# Patient Record
Sex: MALE | Race: WHITE | Employment: FULL TIME | URBAN - NONMETROPOLITAN AREA
[De-identification: names, ages, dates, MRNs, and addresses within clinical notes are randomized per-mention and may not be internally consistent; named-entity substitution may affect disease eponyms.]

---

## 2018-03-10 ENCOUNTER — OFFICE VISIT (OUTPATIENT)
Dept: PRIMARY CARE CLINIC | Age: 33
End: 2018-03-10
Payer: COMMERCIAL

## 2018-03-10 VITALS
TEMPERATURE: 99.1 F | WEIGHT: 222 LBS | OXYGEN SATURATION: 96 % | SYSTOLIC BLOOD PRESSURE: 110 MMHG | DIASTOLIC BLOOD PRESSURE: 86 MMHG | BODY MASS INDEX: 31.08 KG/M2 | HEIGHT: 71 IN | HEART RATE: 88 BPM

## 2018-03-10 DIAGNOSIS — J03.90 TONSILLITIS: Primary | ICD-10-CM

## 2018-03-10 LAB — S PYO AG THROAT QL: NORMAL

## 2018-03-10 PROCEDURE — 99202 OFFICE O/P NEW SF 15 MIN: CPT | Performed by: FAMILY MEDICINE

## 2018-03-10 PROCEDURE — 87880 STREP A ASSAY W/OPTIC: CPT | Performed by: FAMILY MEDICINE

## 2018-03-10 ASSESSMENT — ENCOUNTER SYMPTOMS
SHORTNESS OF BREATH: 0
TROUBLE SWALLOWING: 0
NAUSEA: 0
DIARRHEA: 0
CONSTIPATION: 0
EYE REDNESS: 0
EYE DISCHARGE: 0
COUGH: 1
SINUS PAIN: 0
VOMITING: 0
RHINORRHEA: 1
SINUS PRESSURE: 0
ABDOMINAL PAIN: 0
SORE THROAT: 1
WHEEZING: 0

## 2018-04-13 ENCOUNTER — APPOINTMENT (OUTPATIENT)
Dept: GENERAL RADIOLOGY | Age: 33
End: 2018-04-13
Payer: COMMERCIAL

## 2018-04-13 ENCOUNTER — HOSPITAL ENCOUNTER (EMERGENCY)
Age: 33
Discharge: HOME | End: 2018-04-13
Attending: EMERGENCY MEDICINE
Payer: COMMERCIAL

## 2018-04-13 VITALS
SYSTOLIC BLOOD PRESSURE: 133 MMHG | WEIGHT: 220 LBS | BODY MASS INDEX: 30.8 KG/M2 | TEMPERATURE: 97.4 F | HEIGHT: 71 IN | RESPIRATION RATE: 15 BRPM | DIASTOLIC BLOOD PRESSURE: 83 MMHG | OXYGEN SATURATION: 99 % | HEART RATE: 102 BPM

## 2018-04-13 DIAGNOSIS — S90.31XA CONTUSION OF RIGHT FOOT, INITIAL ENCOUNTER: Primary | ICD-10-CM

## 2018-04-13 PROCEDURE — 73630 X-RAY EXAM OF FOOT: CPT

## 2018-04-13 PROCEDURE — 73610 X-RAY EXAM OF ANKLE: CPT

## 2018-04-13 PROCEDURE — 99283 EMERGENCY DEPT VISIT LOW MDM: CPT

## 2018-04-13 ASSESSMENT — PAIN DESCRIPTION - LOCATION: LOCATION: ANKLE;FOOT

## 2018-04-13 ASSESSMENT — PAIN SCALES - GENERAL: PAINLEVEL_OUTOF10: 8

## 2018-04-13 ASSESSMENT — PAIN DESCRIPTION - ORIENTATION: ORIENTATION: RIGHT

## 2018-04-26 ENCOUNTER — HOSPITAL ENCOUNTER (OUTPATIENT)
Dept: MRI IMAGING | Age: 33
Discharge: HOME | End: 2018-04-26
Payer: COMMERCIAL

## 2018-04-26 DIAGNOSIS — S86.001A INJURY OF RIGHT ACHILLES TENDON, INITIAL ENCOUNTER: ICD-10-CM

## 2018-04-26 PROCEDURE — 73721 MRI JNT OF LWR EXTRE W/O DYE: CPT

## 2022-04-07 ENCOUNTER — TELEPHONE (OUTPATIENT)
Dept: FAMILY MEDICINE CLINIC | Age: 37
End: 2022-04-07

## 2022-06-08 ENCOUNTER — OFFICE VISIT (OUTPATIENT)
Dept: FAMILY MEDICINE CLINIC | Age: 37
End: 2022-06-08
Payer: COMMERCIAL

## 2022-06-08 VITALS
SYSTOLIC BLOOD PRESSURE: 128 MMHG | OXYGEN SATURATION: 96 % | HEIGHT: 71 IN | WEIGHT: 249 LBS | HEART RATE: 88 BPM | BODY MASS INDEX: 34.86 KG/M2 | RESPIRATION RATE: 18 BRPM | DIASTOLIC BLOOD PRESSURE: 76 MMHG | TEMPERATURE: 97.7 F

## 2022-06-08 DIAGNOSIS — G47.33 OSA ON CPAP: Primary | ICD-10-CM

## 2022-06-08 DIAGNOSIS — Z99.89 OSA ON CPAP: Primary | ICD-10-CM

## 2022-06-08 DIAGNOSIS — M25.562 ACUTE PAIN OF LEFT KNEE: ICD-10-CM

## 2022-06-08 PROCEDURE — G8427 DOCREV CUR MEDS BY ELIG CLIN: HCPCS | Performed by: NURSE PRACTITIONER

## 2022-06-08 PROCEDURE — G8417 CALC BMI ABV UP PARAM F/U: HCPCS | Performed by: NURSE PRACTITIONER

## 2022-06-08 PROCEDURE — 4004F PT TOBACCO SCREEN RCVD TLK: CPT | Performed by: NURSE PRACTITIONER

## 2022-06-08 PROCEDURE — 99203 OFFICE O/P NEW LOW 30 MIN: CPT | Performed by: NURSE PRACTITIONER

## 2022-06-08 RX ORDER — CETIRIZINE HYDROCHLORIDE 10 MG/1
10 TABLET ORAL DAILY
COMMUNITY

## 2022-06-08 ASSESSMENT — PATIENT HEALTH QUESTIONNAIRE - PHQ9
SUM OF ALL RESPONSES TO PHQ QUESTIONS 1-9: 0
SUM OF ALL RESPONSES TO PHQ QUESTIONS 1-9: 0
SUM OF ALL RESPONSES TO PHQ9 QUESTIONS 1 & 2: 0
2. FEELING DOWN, DEPRESSED OR HOPELESS: 0
SUM OF ALL RESPONSES TO PHQ QUESTIONS 1-9: 0
SUM OF ALL RESPONSES TO PHQ QUESTIONS 1-9: 0
1. LITTLE INTEREST OR PLEASURE IN DOING THINGS: 0

## 2022-06-08 NOTE — PROGRESS NOTES
Neetu Dennis (:  1985) is a 39 y.o. male,Established patient, here for evaluation of the following chief complaint(s): Other ( left sided knee pain )         ASSESSMENT/PLAN:  1. SHERRY on CPAP  2. Acute pain of left knee  -     XR KNEE LEFT (MIN 4 VIEWS); Future    Obtain xray  otc pain relievers  Stretch  Script given for cpap supplies    Return if symptoms worsen or fail to improve. Subjective   SUBJECTIVE/OBJECTIVE:  HPI    Left knee pain when he stepped onto the bed of truck. Hurt for 2 days then stopped hurting. Yesterday kneels down on it and it hurt really bad. Pain minimal today. Hx of SHERRY. On cpap. Was doing good. States needs new supplies for it. Review of Systems   Constitutional: Negative for activity change, appetite change, chills, diaphoresis, fatigue, fever and unexpected weight change. HENT: Negative for congestion, ear pain, postnasal drip, sinus pressure and sore throat. Eyes: Negative for visual disturbance. Respiratory: Negative for cough, chest tightness, shortness of breath, wheezing and stridor. Cardiovascular: Negative for chest pain, palpitations and leg swelling. Gastrointestinal: Negative for abdominal distention, abdominal pain, constipation, diarrhea, nausea and vomiting. Endocrine: Negative for polydipsia, polyphagia and polyuria. Genitourinary: Negative for decreased urine volume, difficulty urinating, dysuria, flank pain, frequency, hematuria and urgency. Musculoskeletal: Positive for arthralgias. Negative for back pain, gait problem, joint swelling, myalgias and neck pain. Skin: Negative for color change, pallor and rash. Neurological: Negative for dizziness, syncope, weakness, light-headedness, numbness and headaches. Hematological: Negative for adenopathy. Psychiatric/Behavioral: Negative for behavioral problems, self-injury and sleep disturbance. The patient is not nervous/anxious.            Objective   Physical Exam  Vitals reviewed. Constitutional:       General: He is not in acute distress. Appearance: Normal appearance. He is well-developed. HENT:      Head: Normocephalic. Right Ear: External ear normal.      Left Ear: External ear normal.      Nose: Nose normal.      Right Sinus: No maxillary sinus tenderness. Left Sinus: No maxillary sinus tenderness. Mouth/Throat:      Pharynx: Uvula midline. Neck:      Trachea: Trachea normal.   Cardiovascular:      Rate and Rhythm: Normal rate and regular rhythm. Heart sounds: Normal heart sounds. No murmur heard. Pulmonary:      Effort: Pulmonary effort is normal. No respiratory distress. Breath sounds: Normal breath sounds. No decreased breath sounds, wheezing, rhonchi or rales. Chest:      Chest wall: No tenderness. Abdominal:      General: There is no distension. Palpations: Abdomen is soft. There is no mass. Tenderness: There is no abdominal tenderness. Musculoskeletal:         General: Tenderness present. No deformity. Normal range of motion. Cervical back: Normal range of motion and neck supple. Lymphadenopathy:      Cervical: No cervical adenopathy. Skin:     General: Skin is warm and dry. Neurological:      Mental Status: He is alert and oriented to person, place, and time. Motor: No abnormal muscle tone. Coordination: Coordination normal.      Gait: Gait normal.   Psychiatric:         Mood and Affect: Mood normal.         Behavior: Behavior normal.         Thought Content: Thought content normal.         Judgment: Judgment normal.            On this date 6/8/2022 I have spent 34 minutes reviewing previous notes, test results and face to face with the patient discussing the diagnosis and importance of compliance with the treatment plan as well as documenting on the day of the visit. An electronic signature was used to authenticate this note.     --ELIESER Mcintosh - CNP

## 2022-06-09 ENCOUNTER — HOSPITAL ENCOUNTER (OUTPATIENT)
Age: 37
Discharge: HOME | End: 2022-06-09
Payer: COMMERCIAL

## 2022-06-09 ENCOUNTER — HOSPITAL ENCOUNTER (OUTPATIENT)
Dept: GENERAL RADIOLOGY | Age: 37
Discharge: HOME | End: 2022-06-09
Payer: COMMERCIAL

## 2022-06-09 DIAGNOSIS — M25.562 ACUTE PAIN OF LEFT KNEE: ICD-10-CM

## 2022-06-09 PROCEDURE — 73564 X-RAY EXAM KNEE 4 OR MORE: CPT

## 2022-06-13 ASSESSMENT — ENCOUNTER SYMPTOMS
SHORTNESS OF BREATH: 0
STRIDOR: 0
COLOR CHANGE: 0
SINUS PRESSURE: 0
DIARRHEA: 0
WHEEZING: 0
COUGH: 0
BACK PAIN: 0
NAUSEA: 0
CONSTIPATION: 0
SORE THROAT: 0
CHEST TIGHTNESS: 0
ABDOMINAL DISTENTION: 0
ABDOMINAL PAIN: 0
VOMITING: 0

## 2023-07-20 ENCOUNTER — OFFICE VISIT (OUTPATIENT)
Dept: FAMILY MEDICINE CLINIC | Age: 38
End: 2023-07-20
Payer: COMMERCIAL

## 2023-07-20 VITALS
HEART RATE: 78 BPM | OXYGEN SATURATION: 98 % | HEIGHT: 71 IN | TEMPERATURE: 98.2 F | BODY MASS INDEX: 34.75 KG/M2 | WEIGHT: 248.25 LBS | DIASTOLIC BLOOD PRESSURE: 86 MMHG | RESPIRATION RATE: 16 BRPM | SYSTOLIC BLOOD PRESSURE: 124 MMHG

## 2023-07-20 DIAGNOSIS — J01.00 ACUTE NON-RECURRENT MAXILLARY SINUSITIS: Primary | ICD-10-CM

## 2023-07-20 DIAGNOSIS — F41.1 GAD (GENERALIZED ANXIETY DISORDER): ICD-10-CM

## 2023-07-20 PROCEDURE — 4004F PT TOBACCO SCREEN RCVD TLK: CPT

## 2023-07-20 PROCEDURE — 99213 OFFICE O/P EST LOW 20 MIN: CPT

## 2023-07-20 PROCEDURE — G8427 DOCREV CUR MEDS BY ELIG CLIN: HCPCS

## 2023-07-20 PROCEDURE — G8417 CALC BMI ABV UP PARAM F/U: HCPCS

## 2023-07-20 RX ORDER — AZITHROMYCIN 250 MG/1
250 TABLET, FILM COATED ORAL SEE ADMIN INSTRUCTIONS
Qty: 6 TABLET | Refills: 0 | Status: SHIPPED | OUTPATIENT
Start: 2023-07-20 | End: 2023-07-25

## 2023-07-20 SDOH — ECONOMIC STABILITY: INCOME INSECURITY: HOW HARD IS IT FOR YOU TO PAY FOR THE VERY BASICS LIKE FOOD, HOUSING, MEDICAL CARE, AND HEATING?: NOT HARD AT ALL

## 2023-07-20 SDOH — ECONOMIC STABILITY: FOOD INSECURITY: WITHIN THE PAST 12 MONTHS, THE FOOD YOU BOUGHT JUST DIDN'T LAST AND YOU DIDN'T HAVE MONEY TO GET MORE.: NEVER TRUE

## 2023-07-20 SDOH — ECONOMIC STABILITY: FOOD INSECURITY: WITHIN THE PAST 12 MONTHS, YOU WORRIED THAT YOUR FOOD WOULD RUN OUT BEFORE YOU GOT MONEY TO BUY MORE.: NEVER TRUE

## 2023-07-20 SDOH — ECONOMIC STABILITY: HOUSING INSECURITY
IN THE LAST 12 MONTHS, WAS THERE A TIME WHEN YOU DID NOT HAVE A STEADY PLACE TO SLEEP OR SLEPT IN A SHELTER (INCLUDING NOW)?: NO

## 2023-07-20 ASSESSMENT — ENCOUNTER SYMPTOMS
COUGH: 0
SINUS PAIN: 1
DIARRHEA: 0
WHEEZING: 0
EYE PAIN: 0
EYE ITCHING: 0
NAUSEA: 0
SHORTNESS OF BREATH: 0
SINUS PRESSURE: 1
CHEST TIGHTNESS: 0
ABDOMINAL PAIN: 0
RHINORRHEA: 1
EYE REDNESS: 0
EYE DISCHARGE: 0
CONSTIPATION: 0
SORE THROAT: 0

## 2023-07-20 ASSESSMENT — PATIENT HEALTH QUESTIONNAIRE - PHQ9
1. LITTLE INTEREST OR PLEASURE IN DOING THINGS: 0
SUM OF ALL RESPONSES TO PHQ QUESTIONS 1-9: 0
2. FEELING DOWN, DEPRESSED OR HOPELESS: 0
SUM OF ALL RESPONSES TO PHQ QUESTIONS 1-9: 0
SUM OF ALL RESPONSES TO PHQ9 QUESTIONS 1 & 2: 0

## 2023-07-20 NOTE — PROGRESS NOTES
Vaping Use: Never used   Substance and Sexual Activity    Alcohol use: Yes     Comment: 2 beers    Drug use: No    Sexual activity: Yes     Partners: Female   Other Topics Concern    Not on file   Social History Narrative    Not on file     Social Determinants of Health     Financial Resource Strain: Low Risk     Difficulty of Paying Living Expenses: Not hard at all   Food Insecurity: No Food Insecurity    Worried About [place] in the Last Year: Never true    [address] in the Last Year: Never true   Transportation Needs: Unknown    Lack of Transportation (Medical): Not on file    Lack of Transportation (Non-Medical): No   Physical Activity: Not on file   Stress: Not on file   Social Connections: Not on file   Intimate Partner Violence: Not on file   Housing Stability: Unknown    Unable to Pay for Housing in the Last Year: Not on file    Number of Places Lived in the Last Year: Not on file    Unstable Housing in the Last Year: No        Family History   Problem Relation Age of Onset    Heart Disease Father         Allergies   Allergen Reactions    Aspirin Shortness Of Breath    Ceclor [Cefaclor] Swelling    Ibuprofen         Review of Systems   Constitutional:  Negative for activity change, appetite change, chills, fatigue and fever. HENT:  Positive for congestion, postnasal drip, rhinorrhea, sinus pressure and sinus pain. Negative for ear pain and sore throat. Eyes:  Negative for pain, discharge, redness and itching. Respiratory:  Negative for cough, chest tightness, shortness of breath and wheezing. Cardiovascular:  Negative for chest pain and palpitations. Gastrointestinal:  Negative for abdominal pain, constipation, diarrhea and nausea. Genitourinary:  Negative for dysuria and hematuria. Musculoskeletal:  Negative for arthralgias and myalgias. Skin:  Negative for rash. Neurological:  Positive for headaches. Negative for dizziness.    Psychiatric/Behavioral:  Negative for

## 2023-07-27 ENCOUNTER — TELEPHONE (OUTPATIENT)
Dept: FAMILY MEDICINE CLINIC | Age: 38
End: 2023-07-27

## 2023-07-27 DIAGNOSIS — J01.00 ACUTE NON-RECURRENT MAXILLARY SINUSITIS: Primary | ICD-10-CM

## 2023-07-27 RX ORDER — PREDNISONE 20 MG/1
20 TABLET ORAL 2 TIMES DAILY
Qty: 10 TABLET | Refills: 0 | Status: SHIPPED | OUTPATIENT
Start: 2023-07-27 | End: 2023-08-01

## 2023-07-27 NOTE — TELEPHONE ENCOUNTER
Pt states he has finished the z-shruthi that was precribed at his visit on 7/20 with you. He states he still having a lot of nasal congestion with sinus pressure. He wondering what else you recommend.

## 2024-01-04 ENCOUNTER — OFFICE VISIT (OUTPATIENT)
Dept: FAMILY MEDICINE CLINIC | Age: 39
End: 2024-01-04
Payer: COMMERCIAL

## 2024-01-04 VITALS
HEART RATE: 76 BPM | TEMPERATURE: 98 F | SYSTOLIC BLOOD PRESSURE: 128 MMHG | OXYGEN SATURATION: 97 % | RESPIRATION RATE: 16 BRPM | BODY MASS INDEX: 36.51 KG/M2 | WEIGHT: 260.8 LBS | HEIGHT: 71 IN | DIASTOLIC BLOOD PRESSURE: 84 MMHG

## 2024-01-04 DIAGNOSIS — J01.00 ACUTE NON-RECURRENT MAXILLARY SINUSITIS: ICD-10-CM

## 2024-01-04 DIAGNOSIS — R09.81 NASAL CONGESTION: Primary | ICD-10-CM

## 2024-01-04 PROCEDURE — 99213 OFFICE O/P EST LOW 20 MIN: CPT

## 2024-01-04 PROCEDURE — G8427 DOCREV CUR MEDS BY ELIG CLIN: HCPCS

## 2024-01-04 PROCEDURE — 4004F PT TOBACCO SCREEN RCVD TLK: CPT

## 2024-01-04 PROCEDURE — G8484 FLU IMMUNIZE NO ADMIN: HCPCS

## 2024-01-04 PROCEDURE — G8417 CALC BMI ABV UP PARAM F/U: HCPCS

## 2024-01-04 RX ORDER — AMOXICILLIN AND CLAVULANATE POTASSIUM 875; 125 MG/1; MG/1
1 TABLET, FILM COATED ORAL 2 TIMES DAILY
Qty: 20 TABLET | Refills: 0 | Status: SHIPPED | OUTPATIENT
Start: 2024-01-04 | End: 2024-01-14

## 2024-01-04 RX ORDER — METHYLPREDNISOLONE 4 MG/1
TABLET ORAL
Qty: 1 KIT | Refills: 0 | Status: SHIPPED | OUTPATIENT
Start: 2024-01-04 | End: 2024-01-10

## 2024-01-04 ASSESSMENT — PATIENT HEALTH QUESTIONNAIRE - PHQ9
1. LITTLE INTEREST OR PLEASURE IN DOING THINGS: 0
SUM OF ALL RESPONSES TO PHQ9 QUESTIONS 1 & 2: 0
SUM OF ALL RESPONSES TO PHQ QUESTIONS 1-9: 0
2. FEELING DOWN, DEPRESSED OR HOPELESS: 0
SUM OF ALL RESPONSES TO PHQ QUESTIONS 1-9: 0

## 2024-01-04 ASSESSMENT — ENCOUNTER SYMPTOMS
COUGH: 1
SORE THROAT: 0
NAUSEA: 0
WHEEZING: 0
RHINORRHEA: 0
DIARRHEA: 0
SINUS PAIN: 1
VOMITING: 0
EYE ITCHING: 0
EYE PAIN: 0
CONSTIPATION: 0
ABDOMINAL PAIN: 1
SHORTNESS OF BREATH: 0
CHEST TIGHTNESS: 0
EYE DISCHARGE: 0
SINUS PRESSURE: 1
EYE REDNESS: 0

## 2024-01-04 NOTE — PROGRESS NOTES
coughing he did start to develop left sided abdominal pain. He notices small lump that is semi tender in this area. Was not present prior. IF he holds this spot while coughing the pain does improve. He has not constipation, indigestion, nausea, vomiting, or diarrhea. No blood in stool.     Past Medical History:   Diagnosis Date    Asthma     Multiple allergies         Past Surgical History:   Procedure Laterality Date    SINUS SURGERY         Social History     Socioeconomic History    Marital status:      Spouse name: Not on file    Number of children: Not on file    Years of education: Not on file    Highest education level: Not on file   Occupational History    Not on file   Tobacco Use    Smoking status: Never    Smokeless tobacco: Current     Types: Chew   Vaping Use    Vaping Use: Never used   Substance and Sexual Activity    Alcohol use: Yes     Comment: 2 beers    Drug use: No    Sexual activity: Yes     Partners: Female   Other Topics Concern    Not on file   Social History Narrative    Not on file     Social Determinants of Health     Financial Resource Strain: Low Risk  (7/20/2023)    Overall Financial Resource Strain (CARDIA)     Difficulty of Paying Living Expenses: Not hard at all   Food Insecurity: Not on file (7/20/2023)   Transportation Needs: Unknown (7/20/2023)    PRAPARE - Transportation     Lack of Transportation (Medical): Not on file     Lack of Transportation (Non-Medical): No   Physical Activity: Not on file   Stress: Not on file   Social Connections: Not on file   Intimate Partner Violence: Not on file   Housing Stability: Unknown (7/20/2023)    Housing Stability Vital Sign     Unable to Pay for Housing in the Last Year: Not on file     Number of Places Lived in the Last Year: Not on file     Unstable Housing in the Last Year: No        Family History   Problem Relation Age of Onset    Heart Disease Father         Allergies   Allergen Reactions    Aspirin Shortness Of Breath

## 2024-04-15 ENCOUNTER — OFFICE VISIT (OUTPATIENT)
Dept: FAMILY MEDICINE CLINIC | Age: 39
End: 2024-04-15
Payer: COMMERCIAL

## 2024-04-15 VITALS
OXYGEN SATURATION: 96 % | SYSTOLIC BLOOD PRESSURE: 124 MMHG | HEIGHT: 71 IN | RESPIRATION RATE: 18 BRPM | DIASTOLIC BLOOD PRESSURE: 86 MMHG | WEIGHT: 254 LBS | HEART RATE: 81 BPM | BODY MASS INDEX: 35.56 KG/M2

## 2024-04-15 DIAGNOSIS — J01.00 ACUTE NON-RECURRENT MAXILLARY SINUSITIS: Primary | ICD-10-CM

## 2024-04-15 PROCEDURE — 4004F PT TOBACCO SCREEN RCVD TLK: CPT | Performed by: NURSE PRACTITIONER

## 2024-04-15 PROCEDURE — G8427 DOCREV CUR MEDS BY ELIG CLIN: HCPCS | Performed by: NURSE PRACTITIONER

## 2024-04-15 PROCEDURE — G8417 CALC BMI ABV UP PARAM F/U: HCPCS | Performed by: NURSE PRACTITIONER

## 2024-04-15 PROCEDURE — 99213 OFFICE O/P EST LOW 20 MIN: CPT | Performed by: NURSE PRACTITIONER

## 2024-04-15 RX ORDER — AMOXICILLIN AND CLAVULANATE POTASSIUM 875; 125 MG/1; MG/1
1 TABLET, FILM COATED ORAL 2 TIMES DAILY
Qty: 20 TABLET | Refills: 0 | Status: SHIPPED | OUTPATIENT
Start: 2024-04-15 | End: 2024-04-25

## 2024-04-15 RX ORDER — PREDNISONE 20 MG/1
20 TABLET ORAL 2 TIMES DAILY
Qty: 10 TABLET | Refills: 0 | Status: SHIPPED | OUTPATIENT
Start: 2024-04-15 | End: 2024-04-20

## 2024-04-15 ASSESSMENT — ENCOUNTER SYMPTOMS
DIARRHEA: 0
SHORTNESS OF BREATH: 0
COLOR CHANGE: 0
CHEST TIGHTNESS: 0
ABDOMINAL DISTENTION: 0
ABDOMINAL PAIN: 0
STRIDOR: 0
NAUSEA: 0
WHEEZING: 0
CONSTIPATION: 0
COUGH: 0
SINUS PRESSURE: 1
VOMITING: 0
BACK PAIN: 0
SORE THROAT: 0

## 2024-04-15 NOTE — PROGRESS NOTES
Head: Normocephalic and atraumatic.      Right Ear: External ear normal.      Left Ear: External ear normal.      Nose: Congestion present.      Right Sinus: No maxillary sinus tenderness.      Left Sinus: No maxillary sinus tenderness.      Mouth/Throat:      Mouth: Mucous membranes are moist.      Pharynx: Oropharynx is clear. Uvula midline. No oropharyngeal exudate or posterior oropharyngeal erythema.   Neck:      Trachea: Trachea normal.   Cardiovascular:      Rate and Rhythm: Normal rate and regular rhythm.      Heart sounds: Normal heart sounds. No murmur heard.  Pulmonary:      Effort: Pulmonary effort is normal. No respiratory distress.      Breath sounds: Normal breath sounds. No decreased breath sounds, wheezing, rhonchi or rales.   Chest:      Chest wall: No tenderness.   Abdominal:      General: There is no distension.      Palpations: Abdomen is soft. There is no mass.      Tenderness: There is no abdominal tenderness.   Musculoskeletal:         General: No tenderness or deformity. Normal range of motion.      Cervical back: Normal range of motion and neck supple.   Lymphadenopathy:      Cervical: No cervical adenopathy.   Skin:     General: Skin is warm and dry.   Neurological:      Mental Status: He is alert and oriented to person, place, and time.      Motor: No abnormal muscle tone.      Coordination: Coordination normal.      Gait: Gait normal.   Psychiatric:         Mood and Affect: Mood normal.         Behavior: Behavior normal.         Thought Content: Thought content normal.         Judgment: Judgment normal.            On this date 4/15/2024 I have spent 25 minutes reviewing previous notes, test results and face to face with the patient discussing the diagnosis and importance of compliance with the treatment plan as well as documenting on the day of the visit.      An electronic signature was used to authenticate this note.    --Bubba Grider, ELIESER - CNP

## 2024-05-03 ENCOUNTER — OFFICE VISIT (OUTPATIENT)
Dept: FAMILY MEDICINE CLINIC | Age: 39
End: 2024-05-03
Payer: COMMERCIAL

## 2024-05-03 VITALS
SYSTOLIC BLOOD PRESSURE: 118 MMHG | HEART RATE: 72 BPM | RESPIRATION RATE: 18 BRPM | BODY MASS INDEX: 36.26 KG/M2 | OXYGEN SATURATION: 96 % | TEMPERATURE: 98.3 F | DIASTOLIC BLOOD PRESSURE: 74 MMHG | HEIGHT: 71 IN | WEIGHT: 259 LBS

## 2024-05-03 DIAGNOSIS — J30.2 SEASONAL ALLERGIES: Primary | ICD-10-CM

## 2024-05-03 PROCEDURE — 99213 OFFICE O/P EST LOW 20 MIN: CPT

## 2024-05-03 PROCEDURE — G8427 DOCREV CUR MEDS BY ELIG CLIN: HCPCS

## 2024-05-03 PROCEDURE — G8417 CALC BMI ABV UP PARAM F/U: HCPCS

## 2024-05-03 PROCEDURE — 4004F PT TOBACCO SCREEN RCVD TLK: CPT

## 2024-05-03 RX ORDER — PREDNISONE 20 MG/1
20 TABLET ORAL 2 TIMES DAILY
Qty: 10 TABLET | Refills: 0 | Status: SHIPPED | OUTPATIENT
Start: 2024-05-03 | End: 2024-05-08

## 2024-05-03 NOTE — PROGRESS NOTES
external ear normal. A middle ear effusion is present.      Nose: Congestion present. No rhinorrhea.      Right Sinus: Maxillary sinus tenderness present. No frontal sinus tenderness.      Left Sinus: Maxillary sinus tenderness present. No frontal sinus tenderness.      Mouth/Throat:      Pharynx: No oropharyngeal exudate or posterior oropharyngeal erythema.   Eyes:      General: No scleral icterus.        Right eye: No discharge.         Left eye: No discharge.      Conjunctiva/sclera: Conjunctivae normal.      Pupils: Pupils are equal, round, and reactive to light.   Cardiovascular:      Rate and Rhythm: Normal rate and regular rhythm.      Heart sounds: Normal heart sounds.   Pulmonary:      Effort: Pulmonary effort is normal. No respiratory distress.      Breath sounds: Normal breath sounds. No wheezing.   Abdominal:      General: Bowel sounds are normal. There is no distension.      Palpations: Abdomen is soft.      Tenderness: There is no abdominal tenderness.   Musculoskeletal:         General: No tenderness. Normal range of motion.      Cervical back: Normal range of motion and neck supple.   Lymphadenopathy:      Cervical: No cervical adenopathy.   Skin:     General: Skin is warm and dry.      Findings: No rash.   Neurological:      Mental Status: He is alert and oriented to person, place, and time.      Motor: No abnormal muscle tone.      Coordination: Coordination normal.   Psychiatric:         Mood and Affect: Mood and affect normal.         Behavior: Behavior normal.         Thought Content: Thought content normal.         Judgment: Judgment normal.         Vitals:    05/03/24 1158   BP: 118/74   Pulse: 72   Resp: 18   Temp: 98.3 °F (36.8 °C)   SpO2: 96%        On this date 5/3/2024 I have spent 25 minutes reviewing previous notes, test results and face to face with the patient discussing the diagnosis and importance of compliance with the treatment plan as well as documenting on the day of the

## 2024-05-04 ASSESSMENT — ENCOUNTER SYMPTOMS
SINUS PAIN: 1
ABDOMINAL PAIN: 0
SORE THROAT: 0
WHEEZING: 0
SHORTNESS OF BREATH: 0
COUGH: 0
DIARRHEA: 0
CONSTIPATION: 0
RHINORRHEA: 0
SINUS PRESSURE: 1
NAUSEA: 0

## 2024-11-11 ENCOUNTER — OFFICE VISIT (OUTPATIENT)
Dept: FAMILY MEDICINE CLINIC | Age: 39
End: 2024-11-11

## 2024-11-11 VITALS
DIASTOLIC BLOOD PRESSURE: 80 MMHG | SYSTOLIC BLOOD PRESSURE: 122 MMHG | TEMPERATURE: 99.4 F | BODY MASS INDEX: 35 KG/M2 | WEIGHT: 250 LBS | HEART RATE: 104 BPM | HEIGHT: 71 IN | OXYGEN SATURATION: 95 % | RESPIRATION RATE: 16 BRPM

## 2024-11-11 DIAGNOSIS — J01.00 ACUTE NON-RECURRENT MAXILLARY SINUSITIS: Primary | ICD-10-CM

## 2024-11-11 LAB
INFLUENZA VIRUS A RNA: NEGATIVE
INFLUENZA VIRUS B RNA: NEGATIVE

## 2024-11-11 RX ORDER — DOXYCYCLINE HYCLATE 100 MG
100 TABLET ORAL 2 TIMES DAILY
Qty: 14 TABLET | Refills: 0 | Status: SHIPPED | OUTPATIENT
Start: 2024-11-11 | End: 2024-11-18

## 2024-11-11 RX ORDER — METHYLPREDNISOLONE ACETATE 80 MG/ML
120 INJECTION, SUSPENSION INTRA-ARTICULAR; INTRALESIONAL; INTRAMUSCULAR; SOFT TISSUE ONCE
Status: COMPLETED | OUTPATIENT
Start: 2024-11-11 | End: 2024-11-11

## 2024-11-11 RX ADMIN — METHYLPREDNISOLONE ACETATE 120 MG: 80 INJECTION, SUSPENSION INTRA-ARTICULAR; INTRALESIONAL; INTRAMUSCULAR; SOFT TISSUE at 14:47

## 2024-11-11 SDOH — ECONOMIC STABILITY: FOOD INSECURITY: WITHIN THE PAST 12 MONTHS, YOU WORRIED THAT YOUR FOOD WOULD RUN OUT BEFORE YOU GOT MONEY TO BUY MORE.: NEVER TRUE

## 2024-11-11 SDOH — ECONOMIC STABILITY: INCOME INSECURITY: HOW HARD IS IT FOR YOU TO PAY FOR THE VERY BASICS LIKE FOOD, HOUSING, MEDICAL CARE, AND HEATING?: NOT VERY HARD

## 2024-11-11 SDOH — ECONOMIC STABILITY: FOOD INSECURITY: WITHIN THE PAST 12 MONTHS, THE FOOD YOU BOUGHT JUST DIDN'T LAST AND YOU DIDN'T HAVE MONEY TO GET MORE.: NEVER TRUE

## 2024-11-11 ASSESSMENT — ENCOUNTER SYMPTOMS
ABDOMINAL PAIN: 0
SORE THROAT: 0
CONSTIPATION: 0
RHINORRHEA: 1
NAUSEA: 0
COUGH: 1
WHEEZING: 0
SINUS PAIN: 1
SINUS PRESSURE: 1
DIARRHEA: 0
SHORTNESS OF BREATH: 0

## 2024-11-11 NOTE — PROGRESS NOTES
[facility]  [address]   [place] [id number]  Dept: [phone]  Loc: [phone]     Mango Gentile (:  1985) is a 39 y.o. male, here for evaluation of the following chief complaint(s):  Cough (Runny nose , chest congestion x 2 days )      ASSESSMENT/PLAN:  1. Acute non-recurrent maxillary sinusitis  -     methylPREDNISolone acetate (DEPO-MEDROL) injection 120 mg; 120 mg, IntraMUSCular, ONCE, 1 dose, On 24 at 1500  Flu/covid negative  Doxy and depo medrol for sinusitis    Return if symptoms worsen or fail to improve.    SUBJECTIVE/OBJECTIVE:  Cough  This is a new problem. The current episode started in the past 7 days. The problem has been unchanged. The cough is Productive of sputum. Associated symptoms include chills, ear congestion, ear pain (fullness), nasal congestion, postnasal drip and rhinorrhea. Pertinent negatives include no chest pain, fever, headaches, myalgias, sore throat, shortness of breath, weight loss or wheezing. Nothing aggravates the symptoms. He has tried OTC cough suppressant and rest for the symptoms. The treatment provided mild relief. There is no history of asthma or COPD.       Review of Systems   Constitutional:  Positive for chills and fatigue. Negative for fever and weight loss.   HENT:  Positive for congestion, ear pain (fullness), postnasal drip, rhinorrhea, sinus pressure and sinus pain. Negative for sore throat.    Respiratory:  Positive for cough. Negative for shortness of breath and wheezing.    Cardiovascular:  Negative for chest pain and palpitations.   Gastrointestinal:  Negative for abdominal pain, constipation, diarrhea and nausea.   Genitourinary:  Negative for dysuria and hematuria.   Musculoskeletal:  Negative for arthralgias and myalgias.   Neurological:  Negative for dizziness and headaches.   Psychiatric/Behavioral:  Negative for sleep disturbance. The patient is not

## 2025-02-27 NOTE — TELEPHONE ENCOUNTER
This medication refill is regarding a electronic request.  Refill requested by  the pharmacy .    Requested Prescriptions     Pending Prescriptions Disp Refills    sertraline (ZOLOFT) 50 MG tablet [Pharmacy Med Name: SERTRALINE HYDROCHLORIDE 50MG TABLET] 30 tablet 1     Sig: TAKE ONE TABLET DAILY, BY MOUTH.       Date of last visit: 11/11/2024  Date of next visit: Visit date not found  Date of last refill: 3/18/2024  Pharmacy Name: [facility] in [place]

## 2025-05-21 NOTE — TELEPHONE ENCOUNTER
Last visit- 11/11/2024  Next visit- 11/3/2025    Requested Prescriptions     Pending Prescriptions Disp Refills    sertraline (ZOLOFT) 50 MG tablet 30 tablet 1     Sig: Take 1 tablet by mouth daily